# Patient Record
Sex: MALE
[De-identification: names, ages, dates, MRNs, and addresses within clinical notes are randomized per-mention and may not be internally consistent; named-entity substitution may affect disease eponyms.]

---
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
## 2024-11-23 NOTE — PROCEDURE: HIGH RISK MEDICATION MONITORING
36.5 Cephalexin Pregnancy And Lactation Text: This medication is Pregnancy Category B and considered safe during pregnancy.  It is also excreted in breast milk but can be used safely for shorter doses.

## 2025-03-09 NOTE — PROCEDURE: HIGH RISK MEDICATION MONITORING
4 (moderate pain) Enbrel Counseling:  I discussed with the patient the risks of etanercept including but not limited to myelosuppression, immunosuppression, autoimmune hepatitis, demyelinating diseases, lymphoma, and infections.  The patient understands that monitoring is required including a PPD at baseline and must alert us or the primary physician if symptoms of infection or other concerning signs are noted.

## 2025-07-16 NOTE — PROCEDURE: HIGH RISK MEDICATION MONITORING
[FreeTextEntry1] :  If you feel like you have an infection it is important for you to call our office and we will arrange testing of your urine.   Please continue taking Gemtesa for frequency. Refills sent to your pharmacy.   Please call my office if you have any issues with the cost or side effects of the medication.  Please decrease or stop the use of the followin. Coffee (Caffeinated and decaffeinated)  2. Teas (Caffeinated, decaffeinated, Iced tea, and green teas  3. All sodas (Caffeinated, decaffeinated, energy drinks) 4. All carbonated drinks including seltzer water 5. All citric fruit juices  6. Water with lemon or lime  7. Spicy foods  8. Tomato Sauce based foods  9. Chocolate and chocolate containing products  10. All alcohol  For 4-5 days, cut one item from the list out of your diet.   After 4-5 days, if it makes a difference, you have to decide if you want to keep it in or out of your diet.  If it does not make a difference, you may add it back into your diet and remove another item for another 4-5 days.  Avoid liquid intake 2 hours before bedtime. It is recommended that you take sips of water to take your medications before bedtime.   Elevate your legs throughout the day, particularly 2 hours before bedtime.   We recommend wearing compression stockings during the day to help decrease the night time urination.  Schedule a 6 month follow up med check appointment with ALISSA Meléndez, or sooner if you have any issues.    Hydroxychloroquine Pregnancy And Lactation Text: This medication has been shown to cause fetal harm but it isn't assigned a Pregnancy Risk Category. There are small amounts excreted in breast milk.